# Patient Record
Sex: FEMALE | URBAN - METROPOLITAN AREA
[De-identification: names, ages, dates, MRNs, and addresses within clinical notes are randomized per-mention and may not be internally consistent; named-entity substitution may affect disease eponyms.]

---

## 2023-03-22 ENCOUNTER — TELEPHONE (OUTPATIENT)
Dept: FAMILY MEDICINE CLINIC | Facility: CLINIC | Age: 58
End: 2023-03-22

## 2023-03-22 NOTE — TELEPHONE ENCOUNTER
Caller: Edwige Dale    Relationship to patient: Self    Best call back number: [phone]    Chief complaint: ESTABLISHING CARE    Type of visit: NEW PATIENT WITH DR. HERNANDEZ    Requested date: ASAP    If rescheduling, when is the original appointment: N/A    Additional notes: THE PATIENT STATES THAT HER DAUGHTER WENT TO [employer] WITH DR. HERNANDEZ. THE PATIENT'S DAUGHTER RECOMMENDED DR. HERNANDEZ AS A PRIMARY CARE PHYSICIAN. THE PATIENT WAS ALSO RECOMMENDED TO SEE DR. HERNANDEZ THROUGH A DR. VIJAYA MORELAND. THE PATIENT WOULD LIKE TO SET-UP NEW PATIENT APPOINTMENTS FOR HERSELF AND HER MOTHER.

## 2023-03-22 NOTE — TELEPHONE ENCOUNTER
Pt is informed that Dr. Stroud is not accepting new patients but requested that I send the message directly to Dr. Stroud instead

## 2023-10-02 ENCOUNTER — OFFICE VISIT (OUTPATIENT)
Dept: FAMILY MEDICINE CLINIC | Facility: CLINIC | Age: 58
End: 2023-10-02
Payer: COMMERCIAL

## 2023-10-02 VITALS
HEART RATE: 95 BPM | HEIGHT: 68 IN | SYSTOLIC BLOOD PRESSURE: 122 MMHG | DIASTOLIC BLOOD PRESSURE: 78 MMHG | BODY MASS INDEX: 23.79 KG/M2 | TEMPERATURE: 97.6 F | OXYGEN SATURATION: 98 % | WEIGHT: 157 LBS

## 2023-10-02 DIAGNOSIS — M85.88 OSTEOPENIA OF LUMBAR SPINE: ICD-10-CM

## 2023-10-02 DIAGNOSIS — Z00.00 HEALTH MAINTENANCE EXAMINATION: Primary | ICD-10-CM

## 2023-10-02 DIAGNOSIS — E06.3 HASHIMOTO'S THYROIDITIS: ICD-10-CM

## 2023-10-02 DIAGNOSIS — E78.41 ELEVATED LIPOPROTEIN(A): ICD-10-CM

## 2023-10-02 DIAGNOSIS — E04.1 THYROID NODULE: ICD-10-CM

## 2023-10-02 PROBLEM — M85.80 OSTEOPENIA: Status: ACTIVE | Noted: 2022-05-25

## 2023-10-02 PROBLEM — E78.5 HYPERLIPIDEMIA: Status: ACTIVE | Noted: 2023-10-02

## 2023-10-02 PROBLEM — M54.12 CERVICAL RADICULOPATHY: Status: ACTIVE | Noted: 2018-04-27

## 2023-10-02 PROBLEM — F43.0 ACUTE STRESS DISORDER: Status: ACTIVE | Noted: 2023-10-02

## 2023-10-02 PROBLEM — G43.909 MIGRAINE: Status: ACTIVE | Noted: 2023-10-02

## 2023-10-02 PROCEDURE — 99386 PREV VISIT NEW AGE 40-64: CPT | Performed by: FAMILY MEDICINE

## 2023-10-02 RX ORDER — CETIRIZINE HYDROCHLORIDE 10 MG/1
10 TABLET ORAL DAILY
COMMUNITY

## 2023-10-02 RX ORDER — VIT C/B6/B5/MAGNESIUM/HERB 173 50-5-6-5MG
CAPSULE ORAL
COMMUNITY

## 2023-10-02 RX ORDER — ZINC GLUCONATE 50 MG
TABLET ORAL
COMMUNITY
End: 2023-10-02

## 2023-10-02 RX ORDER — BIOTIN 10000 MCG
CAPSULE ORAL
COMMUNITY

## 2023-10-02 RX ORDER — SUMATRIPTAN 100 MG/1
100 TABLET, FILM COATED ORAL ONCE AS NEEDED
COMMUNITY

## 2023-10-02 RX ORDER — HYDROCORTISONE ACETATE 0.5 %
CREAM (GRAM) TOPICAL
COMMUNITY

## 2023-10-02 NOTE — PROGRESS NOTES
"Chief Complaint  Establish Care (Establishing today with dr perez ) and Migraine (No complains would like script for her migraine medications )    Subjective        dEwige Dale presents to [facility]  Migraine  Pleasant 58-year-old female here as a new patient to me in this practice.  Recently relocated from [place].She has been recovering from an unfortunate divorce due to infidelity but thankfully she has good support with her daughter here and is very thankful.  Financially she does have limitations of a fixed income, she does enjoy caring for her grandson.  Cares for her mom with 2 cases of cancer diagnosed in the last 2 years    Neck pain still present -   Annual Exam.    Health Maintenance   Topic Date Due    MAMMOGRAM  Never done    DXA SCAN  Never done    ZOSTER VACCINE (1 of 2) Never done    HEPATITIS C SCREENING  Never done    PAP SMEAR  Never done    INFLUENZA VACCINE  08/01/2023    COVID-19 Vaccine (4 - 2023-24 season) 09/01/2023    LIPID PANEL  Never done    ANNUAL PHYSICAL  10/02/2024    COLORECTAL CANCER SCREENING  01/01/2026    TDAP/TD VACCINES (3 - Td or Tdap) 04/27/2032    Pneumococcal Vaccine 0-64  Aged Out       Overall living a healthy lifestyle, staying active and feeling well.  GYN: UTD - in 8/2/23 with Mary Ruelas. Dexa was 3/29/22 -possibly due next year, will get records  Immunizations: Due, flu shot not available in office today  Colon cancer screening: done in 2016-due in 10 years, will get records  Sleep/mental health: Substantial stress after recovering from her recent divorce, she is in counseling currently.  Not needing medications    Neck pain will feed migraines and improves with massage.     Objective   Vital Signs:  /78   Pulse 95   Temp 97.6 °F (36.4 °C)   Ht 172.7 cm (68\")   Wt 71.2 kg (157 lb)   SpO2 98%   BMI 23.87 kg/m²   Estimated body mass index is 23.87 kg/m² as calculated from the following:    Height as of this encounter: " "172.7 cm (68\").    Weight as of this encounter: 71.2 kg (157 lb).       BMI is within normal parameters. No other follow-up for BMI required.      Physical Exam  Vitals and nursing note reviewed.   Constitutional:       General: She is not in acute distress.     Appearance: Normal appearance. She is well-developed.   HENT:      Head: Normocephalic.      Right Ear: Tympanic membrane and ear canal normal. There is no impacted cerumen.      Left Ear: Tympanic membrane and ear canal normal. There is no impacted cerumen.      Nose: Nose normal.   Eyes:      Conjunctiva/sclera: Conjunctivae normal.      Pupils: Pupils are equal, round, and reactive to light.   Neck:      Vascular: No carotid bruit.   Cardiovascular:      Rate and Rhythm: Normal rate and regular rhythm.      Heart sounds: Normal heart sounds. No murmur heard.  Pulmonary:      Effort: Pulmonary effort is normal. No respiratory distress.      Breath sounds: Normal breath sounds.   Abdominal:      General: Abdomen is flat. Bowel sounds are normal. There is no distension.      Tenderness: There is no abdominal tenderness.   Musculoskeletal:         General: Normal range of motion.      Right lower leg: No edema.      Left lower leg: No edema.   Skin:     General: Skin is warm and dry.      Findings: No rash.   Neurological:      Mental Status: She is alert and oriented to person, place, and time.   Psychiatric:         Behavior: Behavior normal.         Thought Content: Thought content normal.         Judgment: Judgment normal.      Result Review :                   Assessment and Plan   Diagnoses and all orders for this visit:    1. Health maintenance examination (Primary)    2. Osteopenia of lumbar spine  -     Vitamin D,25-Hydroxy    3. Hashimoto's thyroiditis  -     TSH  -     T4, free  -     T3, free  -     Thyroid Antibodies  -     Ambulatory Referral to ENT (Otolaryngology)    4. Elevated lipoprotein(a)  -     Comprehensive Metabolic Panel  -     CBC & " Differential  -     Lipid Panel    5. Thyroid nodule  -     Ambulatory Referral to ENT (Otolaryngology)    Here for annual exam, fasting labs ordered as above, immunizations up-to-date, colon cancer screening due in 2026, patient advised to fill out record release form at checkout, GYN health up-to-date with her gynecologist, will request records-DEXA scan in 2022 due next year, cardiovascular screening overall doing well, strong family history of CAD, it sounds like she has had elevated cholesterol in the past no smoking.  Normal exam today, counseled patient to increase vegetable intake, water and 150 minutes of exercise weekly combining weightbearing exercises and aerobic activity.    CAD: This would probably be something we will need to follow in the coming years.    Thyroid nodule, chronic problem that is been well controlled but needs a new ENT and [place] to follow-up her last thyroid nodule, 14 mm from reports in [place].  Last labs were normal in March 2022.  Thankfully she has been doing well asymptomatic.     I spent 46 minutes caring for Edwige on this date of service. This time includes time spent by me in the following activities:preparing for the visit, performing a medically appropriate examination and/or evaluation , counseling and educating the patient/family/caregiver, ordering medications, tests, or procedures, referring and communicating with other health care professionals , and documenting information in the medical record  Follow Up   Return in about 1 year (around 10/2/2024), or if symptoms worsen or fail to improve, for Annual Physical with fasting labs prior.  Patient was given instructions and counseling regarding her condition or for health maintenance advice. Please see specific information pulled into the AVS if appropriate.     Cristina Stroud MD

## 2023-10-11 ENCOUNTER — PATIENT ROUNDING (BHMG ONLY) (OUTPATIENT)
Dept: FAMILY MEDICINE CLINIC | Facility: CLINIC | Age: 58
End: 2023-10-11
Payer: COMMERCIAL

## 2023-10-11 NOTE — PROGRESS NOTES
October 11, 2023    Hello, may I speak with Edwige Dale?    My name is Sofie Sterling      I am  with [facility]  [address] [place] [id number] [phone].    Before we get started may I verify your date of birth? 1965    I am calling to officially welcome you to our practice and ask about your recent visit. Is this a good time to talk? No, my chart message sent

## 2023-10-11 NOTE — PROGRESS NOTES
"Chief Complaint  Annual Exam (No complains doing well )    Subjective        Edwige Dale presents to [facility]  History of Present Illness  Annual Exam.    Health Maintenance   Topic Date Due    LIPID PANEL  Never done    ZOSTER VACCINE (1 of 2) Never done    HEPATITIS C SCREENING  Never done    INFLUENZA VACCINE  08/01/2024    COVID-19 Vaccine (4 - 2023-24 season) 09/01/2024    ANNUAL PHYSICAL  10/02/2024    COLORECTAL CANCER SCREENING  01/01/2026    MAMMOGRAM  08/07/2026    DXA SCAN  08/07/2026    PAP SMEAR  08/09/2026    TDAP/TD VACCINES (3 - Td or Tdap) 04/27/2032    Pneumococcal Vaccine 0-64  Aged Out       Diet: Eating healthfully  Exercise: Exercising  GYN: UTD with Dr Ruelas 8/7/24  Immunizations: Discussed  Colon cancer screening: Up-to-date 16 years  Sleep/mental health: Overall doing well  Dentist: Up-to-date  Derm: UTD Dr Dye   Migraines have been ok, not needed all the imitrex, and it helping     Objective   Vital Signs:  /80   Pulse 69   Temp 97.6 °F (36.4 °C)   Ht 172.7 cm (68\")   Wt 73.9 kg (163 lb)   SpO2 98%   BMI 24.78 kg/m²   Estimated body mass index is 24.78 kg/m² as calculated from the following:    Height as of this encounter: 172.7 cm (68\").    Weight as of this encounter: 73.9 kg (163 lb).    BMI is within normal parameters. No other follow-up for BMI required.      Physical Exam  Vitals and nursing note reviewed.   Constitutional:       General: She is not in acute distress.     Appearance: Normal appearance. She is well-developed.   HENT:      Head: Normocephalic.      Right Ear: Tympanic membrane and ear canal normal. There is no impacted cerumen.      Left Ear: Tympanic membrane and ear canal normal. There is no impacted cerumen.      Nose: Nose normal.   Eyes:      Conjunctiva/sclera: Conjunctivae normal.      Pupils: Pupils are equal, round, and reactive to light.   Neck:      Vascular: No carotid bruit.   Cardiovascular:      Rate and " Rhythm: Normal rate and regular rhythm.      Heart sounds: Normal heart sounds. No murmur heard.  Pulmonary:      Effort: Pulmonary effort is normal. No respiratory distress.      Breath sounds: Normal breath sounds.   Abdominal:      General: Abdomen is flat. Bowel sounds are normal. There is no distension.      Tenderness: There is no abdominal tenderness.   Musculoskeletal:         General: Normal range of motion.   Skin:     General: Skin is warm and dry.      Findings: No rash.   Neurological:      Mental Status: She is alert and oriented to person, place, and time.   Psychiatric:         Behavior: Behavior normal.         Thought Content: Thought content normal.         Judgment: Judgment normal.        Result Review :  Cristina Stroud MD 10/10/2024  Patient Entered Attachment - [id number] (10/04/2024)  Patient Entered Attachment - [id number] (10/04/2024)         Assessment and Plan   Diagnoses and all orders for this visit:    1. Health maintenance examination (Primary)    Here for annual exam, fasting labs done today, immunizations up-to-date, colon cancer screeningUTD, GYN healthDiscussed, cardiovascular screening utd, counseled patient to increase vegetable intake, water and 150 minutes of exercise weekly combining weightbearing exercises and aerobic activity.    Next year due for CBC, CMP, lipids, TSH, vitamin D       Follow Up   Return in about 1 year (around 10/10/2025), or if symptoms worsen or fail to improve, for Annual Physical with fasting labs prior.  Patient was given instructions and counseling regarding her condition or for health maintenance advice. Please see specific information pulled into the AVS if appropriate.

## 2023-11-02 ENCOUNTER — TELEPHONE (OUTPATIENT)
Dept: FAMILY MEDICINE CLINIC | Facility: CLINIC | Age: 58
End: 2023-11-02
Payer: COMMERCIAL

## 2023-11-03 ENCOUNTER — PATIENT MESSAGE (OUTPATIENT)
Dept: FAMILY MEDICINE CLINIC | Facility: CLINIC | Age: 58
End: 2023-11-03
Payer: COMMERCIAL

## 2023-11-03 NOTE — TELEPHONE ENCOUNTER
From: Edwieg Dale  To: Cristina Stroud  Sent: 11/3/2023 2:14 PM EDT  Subject: Lab results from recent exam visit    I received my lab results from my [facility] lab.   I am uploading 3 pages I received.   Thank you.   Edwige

## 2024-04-04 ENCOUNTER — TELEPHONE (OUTPATIENT)
Dept: FAMILY MEDICINE CLINIC | Facility: CLINIC | Age: 59
End: 2024-04-04
Payer: COMMERCIAL

## 2024-04-04 NOTE — TELEPHONE ENCOUNTER
Patient is scheduled for her annual physical on 10/10/24. Would  prefer lab draw prior to annual. If so, may clinical place lab orders? Once lab orders are placed, please send via [employer]. Patient will proceed to [facility] for lab draw one to two weeks prior.

## 2024-04-09 DIAGNOSIS — E06.3 HASHIMOTO'S THYROIDITIS: Primary | ICD-10-CM

## 2024-04-09 DIAGNOSIS — E04.1 THYROID NODULE: ICD-10-CM

## 2024-04-09 DIAGNOSIS — Z13.6 SCREENING, HEART DISEASE, ISCHEMIC: ICD-10-CM

## 2024-04-09 DIAGNOSIS — E78.5 HYPERLIPIDEMIA, UNSPECIFIED HYPERLIPIDEMIA TYPE: ICD-10-CM

## 2024-09-12 RX ORDER — SUMATRIPTAN 100 MG/1
100 TABLET, FILM COATED ORAL ONCE AS NEEDED
Qty: 9 TABLET | Refills: 0 | Status: SHIPPED | OUTPATIENT
Start: 2024-09-12

## 2024-09-12 NOTE — TELEPHONE ENCOUNTER
Caller: Edwige Dale    Relationship: Self    Best call back number: [phone]    Requested Prescriptions:   Requested Prescriptions     Pending Prescriptions Disp Refills    SUMAtriptan (IMITREX) 100 MG tablet       Sig: Take 1 tablet by mouth 1 (One) Time As Needed.        Pharmacy where request should be sent: [facility] [address] [place] [address] [address] [facility] [phone] [facility] [phone]      Last office visit with prescribing clinician: 10/2/2023   Last telemedicine visit with prescribing clinician: Visit date not found   Next office visit with prescribing clinician: 10/10/2024     Additional details provided by patient: 8 TABLETS LEFT..... PATIENT IS REQUESTING IF A A 3 MONTH SUPPLY    Does the patient have less than a 3 day supply:  [] Yes  [] No    Would you like a call back once the refill request has been completed: [] Yes [] No    If the office needs to give you a call back, can they leave a voicemail: [] Yes [] No    Kiana Jones Rep   09/12/24 10:34 EDT

## 2024-10-10 ENCOUNTER — OFFICE VISIT (OUTPATIENT)
Dept: FAMILY MEDICINE CLINIC | Facility: CLINIC | Age: 59
End: 2024-10-10
Payer: COMMERCIAL

## 2024-10-10 VITALS
TEMPERATURE: 97.6 F | OXYGEN SATURATION: 98 % | DIASTOLIC BLOOD PRESSURE: 80 MMHG | SYSTOLIC BLOOD PRESSURE: 132 MMHG | WEIGHT: 163 LBS | HEIGHT: 68 IN | HEART RATE: 69 BPM | BODY MASS INDEX: 24.71 KG/M2

## 2024-10-10 DIAGNOSIS — Z00.00 HEALTH MAINTENANCE EXAMINATION: Primary | ICD-10-CM

## 2024-10-10 PROCEDURE — 99396 PREV VISIT EST AGE 40-64: CPT | Performed by: FAMILY MEDICINE

## 2024-10-24 DIAGNOSIS — R35.0 URINARY FREQUENCY: Primary | ICD-10-CM

## 2024-10-24 LAB
BILIRUB BLD-MCNC: NEGATIVE MG/DL
CLARITY, POC: CLEAR
COLOR UR: YELLOW
EXPIRATION DATE: ABNORMAL
GLUCOSE UR STRIP-MCNC: NEGATIVE MG/DL
KETONES UR QL: NEGATIVE
LEUKOCYTE EST, POC: NEGATIVE
Lab: 4307
NITRITE UR-MCNC: NEGATIVE MG/ML
PH UR: 7 [PH] (ref 5–8)
PROT UR STRIP-MCNC: NEGATIVE MG/DL
RBC # UR STRIP: ABNORMAL /UL
SP GR UR: 1.02 (ref 1–1.03)
UROBILINOGEN UR QL: NORMAL

## 2024-10-24 PROCEDURE — 81003 URINALYSIS AUTO W/O SCOPE: CPT | Performed by: FAMILY MEDICINE

## 2025-01-30 RX ORDER — SUMATRIPTAN SUCCINATE 100 MG/1
TABLET ORAL
Qty: 9 TABLET | Refills: 0 | Status: SHIPPED | OUTPATIENT
Start: 2025-01-30

## 2025-04-03 RX ORDER — SUMATRIPTAN SUCCINATE 100 MG/1
TABLET ORAL
Qty: 9 TABLET | Refills: 0 | Status: SHIPPED | OUTPATIENT
Start: 2025-04-03

## 2025-06-03 ENCOUNTER — TELEPHONE (OUTPATIENT)
Dept: FAMILY MEDICINE CLINIC | Facility: CLINIC | Age: 60
End: 2025-06-03

## 2025-06-03 RX ORDER — SUMATRIPTAN SUCCINATE 100 MG/1
TABLET ORAL
Qty: 9 TABLET | Refills: 0 | Status: SHIPPED | OUTPATIENT
Start: 2025-06-03

## 2025-06-03 NOTE — TELEPHONE ENCOUNTER
Caller: Edwige Dale    Relationship: Self    Best call back number: [phone] (Home)     Requested Prescriptions:   SUMAtriptan (IMITREX) 100 MG tablet        Pharmacy where request should be sent:  [id number], [place] - [address] - [phone] PH - [phone] FX     Last office visit with prescribing clinician: 10/10/2024   Last telemedicine visit with prescribing clinician: Visit date not found   Next office visit with prescribing clinician: 10/13/2025     Additional details provided by patient:  PATIENT CALLED TO ASK IF A NEW PRESCRIPTION TO THE PHARMACY LISTED ABOVE. PATIENT HAS 2 PILLS LEFT    Does the patient have less than a 3 day supply:  [] Yes  [] No    Would you like a call back once the refill request has been completed: [] Yes [] No    If the office needs to give you a call back, can they leave a voicemail: [] Yes [] No    Kiana Clement Rep   06/03/25 15:36 EDT         THANKS